# Patient Record
Sex: MALE | Race: WHITE | NOT HISPANIC OR LATINO | Employment: UNEMPLOYED | URBAN - METROPOLITAN AREA
[De-identification: names, ages, dates, MRNs, and addresses within clinical notes are randomized per-mention and may not be internally consistent; named-entity substitution may affect disease eponyms.]

---

## 2021-03-31 ENCOUNTER — OFFICE VISIT (OUTPATIENT)
Dept: MEDICAL GROUP | Facility: MEDICAL CENTER | Age: 41
End: 2021-03-31
Attending: PHYSICIAN ASSISTANT
Payer: MEDICAID

## 2021-03-31 VITALS
TEMPERATURE: 97.5 F | BODY MASS INDEX: 25.22 KG/M2 | HEART RATE: 85 BPM | SYSTOLIC BLOOD PRESSURE: 136 MMHG | OXYGEN SATURATION: 96 % | RESPIRATION RATE: 18 BRPM | DIASTOLIC BLOOD PRESSURE: 70 MMHG | HEIGHT: 72 IN | WEIGHT: 186.2 LBS

## 2021-03-31 DIAGNOSIS — G89.29 CHRONIC LEFT-SIDED LOW BACK PAIN WITH LEFT-SIDED SCIATICA: ICD-10-CM

## 2021-03-31 DIAGNOSIS — M79.675 PAIN OF LEFT GREAT TOE: ICD-10-CM

## 2021-03-31 DIAGNOSIS — M54.42 CHRONIC LEFT-SIDED LOW BACK PAIN WITH LEFT-SIDED SCIATICA: ICD-10-CM

## 2021-03-31 DIAGNOSIS — Z00.00 HEALTH CARE MAINTENANCE: ICD-10-CM

## 2021-03-31 DIAGNOSIS — L03.032 CELLULITIS OF GREAT TOE OF LEFT FOOT: ICD-10-CM

## 2021-03-31 PROCEDURE — 99204 OFFICE O/P NEW MOD 45 MIN: CPT | Performed by: PHYSICIAN ASSISTANT

## 2021-03-31 PROCEDURE — 99203 OFFICE O/P NEW LOW 30 MIN: CPT | Performed by: PHYSICIAN ASSISTANT

## 2021-03-31 RX ORDER — NAPROXEN 250 MG/1
250 TABLET ORAL 2 TIMES DAILY WITH MEALS
COMMUNITY
End: 2022-08-16

## 2021-03-31 RX ORDER — OMEPRAZOLE 20 MG/1
20 CAPSULE, DELAYED RELEASE ORAL DAILY
COMMUNITY
End: 2021-04-21

## 2021-03-31 RX ORDER — CEPHALEXIN 500 MG/1
500 CAPSULE ORAL 4 TIMES DAILY
Qty: 28 CAPSULE | Refills: 0 | Status: SHIPPED | OUTPATIENT
Start: 2021-03-31 | End: 2021-04-07

## 2021-03-31 ASSESSMENT — PATIENT HEALTH QUESTIONNAIRE - PHQ9: CLINICAL INTERPRETATION OF PHQ2 SCORE: 0

## 2021-03-31 NOTE — PROGRESS NOTES
"Chief Complaint   Patient presents with   • Back Pain   • Toe Pain     Left side    • Establish Care     Subjective:     HPI:   North Kohli is a 40 y.o. male here to establish care and to discuss the evaluation and management of:    Chronic left-sided low back pain with left-sided sciatica  Onset: Gradual. Started in October 2020. Atraumatic.   Location: left thoracolumbar.   Duration: \"Constant.\"   Characteristics: \"8/10 - sharp pain.\"   Aggravating factors: bending over to toward the toes.   Relieving factors: Naproxen and hot compresses.   Treatments tried: Naproxen had mild benefit.   Radiculopathy: left sided buttocks/thighs/lower legs     Red flag signs:  Recent trauma: None.   Bowel/bladder Incontinence or urinary retention: None.   Saddle anesthesia: None.   Weakness, falls: None.   Night pain/fever/weight loss/night sweats: None.   IV drug use: None.   History of cancer (excluding nonmelanoma skin cancer): None.     Pain of left great toe  North presents today to establish care. He reports a 1.5 week history of left great toe pain.  He reports that he had what he describes as a blood blister form under the left great toenail.  This popped and he experienced drainage from underneath the toenail.  He states that the toenail is almost completely detached from the nail bed and he is still experiencing minor discharge.  He has noticed erythema of the great toe, swelling and increased pain.  He denies fevers or chills.    ROS  See HPI.     No Known Allergies    Current medicines (including changes today)  Current Outpatient Medications   Medication Sig Dispense Refill   • naproxen (NAPROSYN) 250 MG Tab Take 250 mg by mouth 2 times a day, with meals.     • omeprazole (PRILOSEC) 20 MG delayed-release capsule Take 20 mg by mouth every day.     • cephALEXin (KEFLEX) 500 MG Cap Take 1 capsule by mouth 4 times a day for 7 days. 28 capsule 0     No current facility-administered medications for this visit. "     He  has a past medical history of Anxiety, Depression, and GERD (gastroesophageal reflux disease).  He  has a past surgical history that includes ent surgery (2000).  Social History     Tobacco Use   • Smoking status: Never Smoker   • Smokeless tobacco: Never Used   Substance Use Topics   • Alcohol use: Not Currently   • Drug use: Not Currently     Comment: meth and THC       Family History   Problem Relation Age of Onset   • Lung Disease Neg Hx    • Psychiatric Illness Neg Hx    • Heart Disease Neg Hx    • Diabetes Neg Hx    • Hypertension Neg Hx    • Hyperlipidemia Neg Hx      Family Status   Relation Name Status   • Neg Hx  (Not Specified)     Patient Active Problem List    Diagnosis Date Noted   • Chronic left-sided low back pain with left-sided sciatica 03/31/2021   • Pain of left great toe 03/31/2021      Objective:     /70 (BP Location: Right arm, Patient Position: Sitting, BP Cuff Size: Adult long)   Pulse 85   Temp 36.4 °C (97.5 °F) (Temporal)   Resp 18   Ht 1.829 m (6')   Wt 84.5 kg (186 lb 3.2 oz)   SpO2 96%  Body mass index is 25.25 kg/m².    Physical Exam:  Physical Exam   Constitutional: He is oriented to person, place, and time and well-developed, well-nourished, and in no distress.   HENT:   Head: Normocephalic.   Right Ear: External ear normal.   Left Ear: External ear normal.   Eyes: Pupils are equal, round, and reactive to light.   Cardiovascular: Normal rate, regular rhythm and normal heart sounds.   Pulmonary/Chest: Effort normal and breath sounds normal.   Musculoskeletal:      Comments: No physical abnormalities or vertebral tenderness.  Normal gait, strength, sensation and reflexes.   Decreased lumbar ROM due to pain.  Positive left sided slump test. Positive left sided Straight leg raise.   Neurological: He is alert and oriented to person, place, and time. Gait normal.   Skin: Skin is warm and dry.   Acute presence of demarcated area of erythema, warmth upon touch, swelling  and discharge from beneath the nail bed.  Diana cellulitis of left great toe.  Great toenail lifted off from the nailbed.    Psychiatric: Affect and judgment normal.   Vitals reviewed.    Assessment and Plan:     The following treatment plan was discussed:    1. Chronic left-sided low back pain with left-sided sciatica  - This is a chronic condition.  - No red flag signs.  - Positive slump and straight leg raise test.  - Plan: Order plain films of the lumbar spine to evaluate for potential bony or osseous abnormalities.  I will notify the patient via telephone once I received and reviewed his imaging results.  - DX-LUMBAR SPINE-4+ VIEWS; Future    2. Cellulitis of great toe of left foot, pain of left great toe  - This is an acute condition.  Vitals are stable.  - See PE for more detail.  - Plan: Start on 7-day course of Keflex for acute infection.  Keep area clean and dry.  If symptoms persist or worsen despite antibiotic therapy he has been instructed to follow-up for further evaluation.  - cephALEXin (KEFLEX) 500 MG Cap; Take 1 capsule by mouth 4 times a day for 7 days.  Dispense: 28 capsule; Refill: 0    3. Health care maintenance  - North is due for yearly labs.  - Comp Metabolic Panel; Future  - CBC WITH DIFFERENTIAL; Future  - HEMOGLOBIN A1C; Future  - Lipid Profile; Future  - TSH WITH REFLEX TO FT4; Future  - VITAMIN D,25 HYDROXY; Future  - VITAMIN B12; Future  - FOLATE; Future  - I will notify the patient via telephone once I have received and reviewed his lab results.    Any change or worsening of signs or symptoms, patient encouraged to follow-up or report to emergency room for further evaluation. Patient verbalizes understanding and agrees.    Follow-Up: Return if symptoms worsen or fail to improve.      PLEASE NOTE: This dictation was created using voice recognition software. I have made every reasonable attempt to correct obvious errors, but I expect that there are errors of grammar and possibly  content that I did not discover before finalizing the note.

## 2021-03-31 NOTE — ASSESSMENT & PLAN NOTE
North presents today to establish care. He reports a 1.5 week history of left great toe pain.  He reports that he had what he describes as a blood blister form under the left great toenail.  This popped and he experienced drainage from underneath the toenail.  He states that the toenail is almost completely detached from the nail bed and he is still experiencing minor discharge.  He has noticed erythema of the great toe, swelling and increased pain.  He denies fevers or chills.

## 2021-03-31 NOTE — ASSESSMENT & PLAN NOTE
"Onset: Gradual. Started in October 2020. Atraumatic.   Location: left thoracolumbar.   Duration: \"Constant.\"   Characteristics: \"8/10 - sharp pain.\"   Aggravating factors: bending over to toward the toes.   Relieving factors: Naproxen and hot compresses.   Treatments tried: Naproxen had mild benefit.   Radiculopathy: left sided buttocks/thighs/lower legs     Red flag signs:  Recent trauma: None.   Bowel/bladder Incontinence or urinary retention: None.   Saddle anesthesia: None.   Weakness, falls: None.   Night pain/fever/weight loss/night sweats: None.   IV drug use: None.   History of cancer (excluding nonmelanoma skin cancer): None.     "

## 2021-04-02 ENCOUNTER — HOSPITAL ENCOUNTER (OUTPATIENT)
Dept: RADIOLOGY | Facility: MEDICAL CENTER | Age: 41
End: 2021-04-02
Attending: PHYSICIAN ASSISTANT
Payer: MEDICAID

## 2021-04-02 DIAGNOSIS — M54.42 CHRONIC LEFT-SIDED LOW BACK PAIN WITH LEFT-SIDED SCIATICA: ICD-10-CM

## 2021-04-02 DIAGNOSIS — G89.29 CHRONIC LEFT-SIDED LOW BACK PAIN WITH LEFT-SIDED SCIATICA: ICD-10-CM

## 2021-04-02 PROCEDURE — 72110 X-RAY EXAM L-2 SPINE 4/>VWS: CPT

## 2021-04-07 DIAGNOSIS — G89.29 CHRONIC LEFT-SIDED LOW BACK PAIN WITH LEFT-SIDED SCIATICA: ICD-10-CM

## 2021-04-07 DIAGNOSIS — M54.42 CHRONIC LEFT-SIDED LOW BACK PAIN WITH LEFT-SIDED SCIATICA: ICD-10-CM

## 2021-04-13 ENCOUNTER — HOSPITAL ENCOUNTER (OUTPATIENT)
Dept: LAB | Facility: MEDICAL CENTER | Age: 41
End: 2021-04-13
Attending: PHYSICIAN ASSISTANT
Payer: MEDICAID

## 2021-04-13 DIAGNOSIS — Z00.00 HEALTH CARE MAINTENANCE: ICD-10-CM

## 2021-04-13 LAB
ALBUMIN SERPL BCP-MCNC: 4.4 G/DL (ref 3.2–4.9)
ALBUMIN/GLOB SERPL: 1.8 G/DL
ALP SERPL-CCNC: 32 U/L (ref 30–99)
ALT SERPL-CCNC: 33 U/L (ref 2–50)
ANION GAP SERPL CALC-SCNC: 8 MMOL/L (ref 7–16)
AST SERPL-CCNC: 26 U/L (ref 12–45)
BASOPHILS # BLD AUTO: 0.7 % (ref 0–1.8)
BASOPHILS # BLD: 0.04 K/UL (ref 0–0.12)
BILIRUB SERPL-MCNC: 0.9 MG/DL (ref 0.1–1.5)
BUN SERPL-MCNC: 26 MG/DL (ref 8–22)
CALCIUM SERPL-MCNC: 9.4 MG/DL (ref 8.5–10.5)
CHLORIDE SERPL-SCNC: 107 MMOL/L (ref 96–112)
CHOLEST SERPL-MCNC: 145 MG/DL (ref 100–199)
CO2 SERPL-SCNC: 27 MMOL/L (ref 20–33)
CREAT SERPL-MCNC: 1.11 MG/DL (ref 0.5–1.4)
EOSINOPHIL # BLD AUTO: 0.17 K/UL (ref 0–0.51)
EOSINOPHIL NFR BLD: 2.9 % (ref 0–6.9)
ERYTHROCYTE [DISTWIDTH] IN BLOOD BY AUTOMATED COUNT: 44.5 FL (ref 35.9–50)
EST. AVERAGE GLUCOSE BLD GHB EST-MCNC: 91 MG/DL
FASTING STATUS PATIENT QL REPORTED: NORMAL
FOLATE SERPL-MCNC: 12.3 NG/ML
GLOBULIN SER CALC-MCNC: 2.4 G/DL (ref 1.9–3.5)
GLUCOSE SERPL-MCNC: 80 MG/DL (ref 65–99)
HBA1C MFR BLD: 4.8 % (ref 4–5.6)
HCT VFR BLD AUTO: 43.1 % (ref 42–52)
HDLC SERPL-MCNC: 46 MG/DL
HGB BLD-MCNC: 14.6 G/DL (ref 14–18)
IMM GRANULOCYTES # BLD AUTO: 0.01 K/UL (ref 0–0.11)
IMM GRANULOCYTES NFR BLD AUTO: 0.2 % (ref 0–0.9)
LDLC SERPL CALC-MCNC: 86 MG/DL
LYMPHOCYTES # BLD AUTO: 2.24 K/UL (ref 1–4.8)
LYMPHOCYTES NFR BLD: 38 % (ref 22–41)
MCH RBC QN AUTO: 31.5 PG (ref 27–33)
MCHC RBC AUTO-ENTMCNC: 33.9 G/DL (ref 33.7–35.3)
MCV RBC AUTO: 92.9 FL (ref 81.4–97.8)
MONOCYTES # BLD AUTO: 0.48 K/UL (ref 0–0.85)
MONOCYTES NFR BLD AUTO: 8.1 % (ref 0–13.4)
NEUTROPHILS # BLD AUTO: 2.95 K/UL (ref 1.82–7.42)
NEUTROPHILS NFR BLD: 50.1 % (ref 44–72)
NRBC # BLD AUTO: 0 K/UL
NRBC BLD-RTO: 0 /100 WBC
PLATELET # BLD AUTO: 241 K/UL (ref 164–446)
PMV BLD AUTO: 11.4 FL (ref 9–12.9)
POTASSIUM SERPL-SCNC: 4.3 MMOL/L (ref 3.6–5.5)
PROT SERPL-MCNC: 6.8 G/DL (ref 6–8.2)
RBC # BLD AUTO: 4.64 M/UL (ref 4.7–6.1)
SODIUM SERPL-SCNC: 142 MMOL/L (ref 135–145)
TRIGL SERPL-MCNC: 66 MG/DL (ref 0–149)
TSH SERPL DL<=0.005 MIU/L-ACNC: 1.05 UIU/ML (ref 0.38–5.33)
VIT B12 SERPL-MCNC: 620 PG/ML (ref 211–911)
WBC # BLD AUTO: 5.9 K/UL (ref 4.8–10.8)

## 2021-04-13 PROCEDURE — 82306 VITAMIN D 25 HYDROXY: CPT

## 2021-04-13 PROCEDURE — 80061 LIPID PANEL: CPT

## 2021-04-13 PROCEDURE — 83036 HEMOGLOBIN GLYCOSYLATED A1C: CPT

## 2021-04-13 PROCEDURE — 82607 VITAMIN B-12: CPT

## 2021-04-13 PROCEDURE — 36415 COLL VENOUS BLD VENIPUNCTURE: CPT

## 2021-04-13 PROCEDURE — 85025 COMPLETE CBC W/AUTO DIFF WBC: CPT

## 2021-04-13 PROCEDURE — 82746 ASSAY OF FOLIC ACID SERUM: CPT

## 2021-04-13 PROCEDURE — 80053 COMPREHEN METABOLIC PANEL: CPT

## 2021-04-13 PROCEDURE — 84443 ASSAY THYROID STIM HORMONE: CPT

## 2021-04-15 LAB — 25(OH)D3 SERPL-MCNC: 40 NG/ML (ref 30–80)

## 2021-04-21 ENCOUNTER — OFFICE VISIT (OUTPATIENT)
Dept: MEDICAL GROUP | Facility: MEDICAL CENTER | Age: 41
End: 2021-04-21
Attending: PHYSICIAN ASSISTANT
Payer: MEDICAID

## 2021-04-21 VITALS
SYSTOLIC BLOOD PRESSURE: 106 MMHG | HEART RATE: 52 BPM | DIASTOLIC BLOOD PRESSURE: 40 MMHG | TEMPERATURE: 97.7 F | OXYGEN SATURATION: 97 % | WEIGHT: 196.6 LBS | HEIGHT: 72 IN | BODY MASS INDEX: 26.63 KG/M2 | RESPIRATION RATE: 18 BRPM

## 2021-04-21 DIAGNOSIS — D22.9 NUMEROUS SKIN MOLES: ICD-10-CM

## 2021-04-21 DIAGNOSIS — Z87.09 HISTORY OF CHRONIC SINUSITIS: ICD-10-CM

## 2021-04-21 DIAGNOSIS — K21.9 GASTROESOPHAGEAL REFLUX DISEASE WITHOUT ESOPHAGITIS: ICD-10-CM

## 2021-04-21 PROCEDURE — 99203 OFFICE O/P NEW LOW 30 MIN: CPT | Performed by: PHYSICIAN ASSISTANT

## 2021-04-21 PROCEDURE — 99213 OFFICE O/P EST LOW 20 MIN: CPT | Performed by: PHYSICIAN ASSISTANT

## 2021-04-21 PROCEDURE — 700111 HCHG RX REV CODE 636 W/ 250 OVERRIDE (IP): Performed by: PHYSICIAN ASSISTANT

## 2021-04-21 PROCEDURE — 99214 OFFICE O/P EST MOD 30 MIN: CPT | Performed by: PHYSICIAN ASSISTANT

## 2021-04-21 RX ORDER — TRIAMCINOLONE ACETONIDE 40 MG/ML
40 INJECTION, SUSPENSION INTRA-ARTICULAR; INTRAMUSCULAR ONCE
Status: COMPLETED | OUTPATIENT
Start: 2021-04-21 | End: 2021-04-21

## 2021-04-21 RX ADMIN — TRIAMCINOLONE ACETONIDE 40 MG: 40 INJECTION, SUSPENSION INTRA-ARTICULAR; INTRAMUSCULAR at 13:22

## 2021-04-21 ASSESSMENT — FIBROSIS 4 INDEX: FIB4 SCORE: 0.73

## 2021-04-21 NOTE — PROGRESS NOTES
Chief Complaint   Patient presents with   • Follow-Up       Subjective:     HPI:   North Kohli is a 40 y.o. male here to discuss the evaluation and management of:    Gastroesophageal reflux disease without esophagitis  This is a chronic condition.  Symptoms: burning sensation, acid reflux, nocturnal/positional pain, antacid relief.  No alarm symptoms: weight loss, dysphagia, epigastric mass, regurgitation, odynophagia, N/V, choking  No atypical symptoms: chronic cough, globus sensation, epigastric pain.  Risk Factors: obesity, recent weight gain, FMHx.  Currently on Omeprazole 20 mg once daily.     Numerous skin moles  North reports a history of multiple moles on his thorax and face. He states that he would like to have them looked at and potentially removed.     ROS  See HPI.     No Known Allergies    Current medicines (including changes today)  Current Outpatient Medications   Medication Sig Dispense Refill   • Omeprazole 20 MG Tablet Delayed Release Dispersible Take 20 mg by mouth every day. 30 tablet 5   • naproxen (NAPROSYN) 250 MG Tab Take 250 mg by mouth 2 times a day, with meals.       No current facility-administered medications for this visit.       Social History     Tobacco Use   • Smoking status: Never Smoker   • Smokeless tobacco: Never Used   Substance Use Topics   • Alcohol use: Not Currently   • Drug use: Not Currently     Comment: meth and THC       Patient Active Problem List    Diagnosis Date Noted   • Gastroesophageal reflux disease without esophagitis 04/21/2021   • Numerous skin moles 04/21/2021   • Chronic left-sided low back pain with left-sided sciatica 03/31/2021   • Pain of left great toe 03/31/2021       Family History   Problem Relation Age of Onset   • Lung Disease Neg Hx    • Psychiatric Illness Neg Hx    • Heart Disease Neg Hx    • Diabetes Neg Hx    • Hypertension Neg Hx    • Hyperlipidemia Neg Hx         Objective:     /40 (BP Location: Right arm, Patient Position:  Sitting, BP Cuff Size: Adult)   Pulse (!) 52   Temp 36.5 °C (97.7 °F) (Temporal)   Resp 18   Ht 1.829 m (6')   Wt 89.2 kg (196 lb 9.6 oz)   SpO2 97%  Body mass index is 26.66 kg/m².    Physical Exam:  Physical Exam   Constitutional: He is oriented to person, place, and time and well-developed, well-nourished, and in no distress.   Cardiovascular: Normal rate, regular rhythm and normal heart sounds.   Pulmonary/Chest: Effort normal and breath sounds normal.   Neurological: He is alert and oriented to person, place, and time. Gait normal.   Skin: Skin is warm and dry.   Multiple fleshy moles of the thorax.    Psychiatric: Affect and judgment normal.   Vitals reviewed.    Assessment and Plan:     The following treatment plan was discussed:    1. Gastroesophageal reflux disease without esophagitis  - This is a chronic condition.   - Plan: Continue on Omeprazole 20 mg once daily. Refill sent to the pharmacy.   - Lifestyle modifications for GERD were discussed to include identifying trigger foods to avoid, avoiding high fat foods and large meals, to be upright while eating and to remain this way for at least 2 hours after meals. No eating prior to bedtime. Avoid chocolate, mint, alcohol, and caffeine as these are common triggers for GERD.  - Omeprazole 20 MG Tablet Delayed Release Dispersible; Take 20 mg by mouth every day.  Dispense: 30 tablet; Refill: 5    2. Numerous skin moles  - REFERRAL TO DERMATOLOGY for annual skin checks and potential biopsy of abnormal lesions.     3. History of chronic sinusitis  - This is a chronic condition.   - Plan: Continue with Kenalog injections every 6 months. Patient tolerated this well.  - triamcinolone acetonide (KENALOG-40) injection 40 mg    Any change or worsening of signs or symptoms, patient encouraged to follow-up or report to emergency room for further evaluation. Patient verbalizes understanding and agrees.    Follow-Up: Return if symptoms worsen or fail to  improve.      PLEASE NOTE: This dictation was created using voice recognition software. I have made every reasonable attempt to correct obvious errors, but I expect that there are errors of grammar and possibly content that I did not discover before finalizing the note.

## 2021-04-21 NOTE — ASSESSMENT & PLAN NOTE
This is a chronic condition.  Symptoms: burning sensation, acid reflux, nocturnal/positional pain, antacid relief.  No alarm symptoms: weight loss, dysphagia, epigastric mass, regurgitation, odynophagia, N/V, choking  No atypical symptoms: chronic cough, globus sensation, epigastric pain.  Risk Factors: obesity, recent weight gain, FMHx.  Currently on Omeprazole 20 mg once daily.

## 2021-04-21 NOTE — ASSESSMENT & PLAN NOTE
North reports a history of multiple moles on his thorax and face. He states that he would like to have them looked at and potentially removed.

## 2021-05-03 ENCOUNTER — PHYSICAL THERAPY (OUTPATIENT)
Dept: PHYSICAL THERAPY | Facility: REHABILITATION | Age: 41
End: 2021-05-03
Attending: PHYSICIAN ASSISTANT
Payer: MEDICAID

## 2021-05-03 DIAGNOSIS — G89.29 CHRONIC LEFT-SIDED LOW BACK PAIN WITH LEFT-SIDED SCIATICA: ICD-10-CM

## 2021-05-03 DIAGNOSIS — M54.42 CHRONIC LEFT-SIDED LOW BACK PAIN WITH LEFT-SIDED SCIATICA: ICD-10-CM

## 2021-05-03 PROCEDURE — 97162 PT EVAL MOD COMPLEX 30 MIN: CPT

## 2021-05-03 ASSESSMENT — ENCOUNTER SYMPTOMS
QUALITY: NUMBNESS
PAIN LOCATION: LOW BACK AND LEFT LEG
ALLEVIATING FACTORS: BACK BRACE
PAIN TIMING: CONSTANT
ALLEVIATING FACTORS: HEAT APPLICATION
EXACERBATED BY: PROLONGED STANDING
EXACERBATED BY: LIFTING
EXACERBATED BY: CARRYING
PAIN SCALE: 4
ALLEVIATING FACTORS: PHARMACOTHERAPY
EXACERBATED BY: BENDING
QUALITY: TIGHT
PAIN TIMING: INTERMITTENT

## 2021-05-03 ASSESSMENT — ACTIVITIES OF DAILY LIVING (ADL): AMBULATION_WITHOUT_ASSISTIVE_DEVICE: INDEPENDENT

## 2021-05-03 NOTE — OP THERAPY EVALUATION
Outpatient Physical Therapy  INITIAL EVALUATION    [facility] Physical [address]  [address]  [facility] [address]  Phone:  [phone]  Fax:  [phone]    Date of Evaluation: 2021    Patient: North Kohli  YOB: 1980  MRN: [id number]     Referring Provider: Victorina Iglesias P.A.-C.  [address] [place] [address]   Referring Diagnosis Chronic left-sided low back pain with left-sided sciatica [M54.42, G89.29]     Time Calculation    Start time: 1330  Stop time: 1424 Time Calculation (min): 54 minutes         Chief Complaint: Back Problem    Visit Diagnoses     ICD-10-CM   1. Chronic left-sided low back pain with left-sided sciatica  M54.42    G89.29       No data found  Subjective:   History of Present Illness:     Date of onset:  10/2/2020    Mechanism of injury:  Back pain since 2020.  Thinks he injured back when weight lifting.  Not as bad now as it was initially.  Left low back pain to posterior left leg to just above knee.  Pt reports numbness in lateral left hip/ posterior thigh is almost constant.  Pain and tightness in low back.  Difficulty getting comfortable lying down at night.  Feels weak in left abdominal, lumbar, hip area.  Pain agg with forward flexion, sitting > 20 min, standing > 5-10 min.  Has to move and turn a lot in bed, feels stiff when lying/sleeping in one position for 30 min or more.  Mornings are difficult to move.  Naproxen daily helps a little.  Back brace sometimes at work, off and on for a few days.    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      Sleep disturbance:  Non-restful sleep (used to toss and turn all night, not as bad now as it was initially)  Pain:     Current pain ratin    Location:  Low back and left leg    Quality:  Tight and numbness    Pain timing:  Constant and intermittent    Relieving factors:  Heat application, back brace and pharmacotherapy (R sidelying)    Aggravating factors:  Prolonged standing,  bending, carrying and lifting  Diagnostic Tests:     X-ray: abnormal      Diagnostic Tests Comments:  The alignment is normal. The vertebral bodies are of normal height. The L5-S1 disc space is slightly narrowed. The remaining spaces are maintained. There is no alteration of alignment with flexion or extension. Minimal anterior spurring is present at L3   and L4.  IMPRESSION:  Disc space narrowing at L5-S1.  Treatments:     None    Patient Goals:     Patient goals for therapy:  Decreased pain and return to sport/leisure activities      Past Medical History:   Diagnosis Date   • Anxiety    • Depression    • GERD (gastroesophageal reflux disease)      Past Surgical History:   Procedure Laterality Date   • ENT SURGERY  2000    adenoids and tubes removed.      Social History     Tobacco Use   • Smoking status: Never Smoker   • Smokeless tobacco: Never Used   Substance Use Topics   • Alcohol use: Not Currently     Family and Occupational History     Socioeconomic History   • Marital status: Single     Spouse name: Not on file   • Number of children: Not on file   • Years of education: Not on file   • Highest education level: Not on file   Occupational History   • Not on file       Objective     Observations   Central spine     Positive for hyperlordosis and thoracic kyphosis.    Palpation   Left   Tenderness of the lumbar paraspinals.     Tenderness     Left Hip   Tenderness in the PSIS and iliolumbar ligament.  No tenderness in the greater trochanter and iliac crest.     Active Range of Motion     Lumbar   Flexion: decreased (50%, increased lumbar pain)  Extension: within functional limits (left glute pain)  Left lateral flexion: within functional limits  Right lateral flexion: within functional limits  Left rotation: within functional limits  Right rotation: within functional limits    Joint Play   Spine     Central PA Temple        L1: WFL       L2: WFL       L3: WFL       L4: hypomobile and painful       L5:  hypomobile and painful       S1: hypomobile and painful    Unilateral PA Glide (left)        S1: hypomobile and painful    Unilateral PA Glide (right)        S1: hypomobile    Left Hip     Long axis distraction: within functional limits    Additional hip joint play details: Left long axis hip distraction causes pain at left glute fold.  Prone L hip IR causes left SI pain.         Strength:      Left Hip   Planes of Motion   Flexion: 4 (painful)  Extension: 4 (painful)  Abduction: 4  Adduction: 4+    Right Hip   Planes of Motion   Flexion: 4 (L lumbar pain)  Extension: 4 (L lumbar pain)  Abduction: 5  Adduction: 5    Left Knee   Flexion: 4- (painful)  Extension: 4- (painful)    Right Knee   Flexion: 5  Extension: 5    Left Ankle/Foot   Dorsiflexion: 4+  Plantar flexion: 4+    Right Ankle/Foot   Dorsiflexion: 5  Plantar flexion: 5    Tests       Lumbar spine (left)      Positive slump.   Lumbar spine (right)     Negative slump.     Left Pelvic Girdle/Sacrum   Negative: sacral thrust and thigh thrust.     Right Pelvic Girdle/Sacrum   Negative: thigh thrust.     Left Hip   Negative SI compression and SI distraction.   SLR: Positive.     Right Hip   Negative SI compression and SI distraction.   SLR: Negative.     Yin LumbarTest   Static tests   Lying prone: no change  Lying prone on elbows: increased lumbar pain, no peripheral symptoms.  Sitting erect: increased leg symptoms  Ambulation     Ambulation: Level Surfaces   Ambulation without assistive device: independent    Observational Gait   Gait: antalgic   Walking speed within functional limits. Increased left swing time.         Therapeutic Exercises (CPT 50699):     1. SKC    2. Supine active HS stretch/ sciatic nerve glide    3. Hooklying TA bracing    4. Hooklying TA march    5. Seated sciatic nerve glides    6. Prone on elbows    7. Standing TA bracing against wall      Therapeutic Exercise Summary: Pt performed these exercises with instruction and SPV.   Provided handout with these exercises for daily HEP.      Time-based treatments/modalities:           Assessment, Response and Plan:   Impairments: abnormal or restricted ROM, difficulty performing job, impaired physical strength, lacks appropriate home exercise program and pain with function    Assessment details:  40 y.o. male presents with lumbar radiculopathy.  Pt demonstrates excess lumbar lordosis, thoracic kyphosis, poor spine alignment and body mechanics.  Pt will benefit from skilled PT services to improve lumbar stabilization, posture, and decreased pain with function.  Prognosis: good    Goals:   Short Term Goals:   1. Standing L/S AROM= WNL in all directions.  2. Pt reports less frequent and less intense peripheral LLE pain/ symptoms.  3. Pt will be independent with daily HEP.  Short term goal time span:  2-4 weeks      Long Term Goals:    1. Pt able to sleep with fewer interruptions from pain.  2. Decreased anti-inflammatory med use required.  3. Patient able to brayn standing 30 min without LLE symptoms.  4. Pt will report increased function via improved scores on Piyush Jude Low Back Pain and Disability Questionnaire.  Long term goal time span:  2-4 months    Plan:   Therapy options:  Physical therapy treatment to continue  Planned therapy interventions:  Therapeutic Exercise (CPT 28121), Mechanical Traction (CPT 13501) and E Stim Unattended (CPT 95590)  Frequency:  2x week  Duration in visits:  10  Discussed with:  Patient      Functional Assessment Used  Piyush Jude Low Back Pain and Disability Score: 25     Referring provider co-signature:  I have reviewed this plan of care and my co-signature certifies the need for services.    Certification Period: 05/03/2021 to  07/12/21    Physician Signature: ________________________________ Date: ______________

## 2021-05-25 ENCOUNTER — PHYSICAL THERAPY (OUTPATIENT)
Dept: PHYSICAL THERAPY | Facility: REHABILITATION | Age: 41
End: 2021-05-25
Attending: PHYSICIAN ASSISTANT
Payer: MEDICAID

## 2021-05-25 DIAGNOSIS — M54.42 CHRONIC LEFT-SIDED LOW BACK PAIN WITH LEFT-SIDED SCIATICA: ICD-10-CM

## 2021-05-25 DIAGNOSIS — G89.29 CHRONIC LEFT-SIDED LOW BACK PAIN WITH LEFT-SIDED SCIATICA: ICD-10-CM

## 2021-05-25 PROCEDURE — 97110 THERAPEUTIC EXERCISES: CPT

## 2021-05-25 PROCEDURE — 97012 MECHANICAL TRACTION THERAPY: CPT

## 2021-05-25 NOTE — OP THERAPY DAILY TREATMENT
"  Outpatient Physical Therapy  DAILY TREATMENT     [facility] Physical [address]  [address]  [place] [address]  Phone:  [phone]  Fax:  [phone]    Date: 05/25/2021    Patient: North Kohli  YOB: 1980  MRN: [id number]     Time Calculation    Start time: 0930  Stop time: 1020 Time Calculation (min): 50 minutes         Chief Complaint: Back Problem    Visit #: 2    SUBJECTIVE:  Pt reports back is a little better since eval.  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  Feels tight on left side, all the way up to neck.  Maybe from work.  States he has been \"tucking in gut\" a lot which seems to help, and DKC stretch feels good.  Taking 1/2 naproxen daily.    OBJECTIVE:        Therapeutic Exercises (CPT 57628):     1. SKC, x30 sec L    2. LTR with ball, x10    3. Hooklying TA bracing, 5sec x5    4. Hooklying TA DKC with ball, x10    5. Hooklying TA bent knee fall out, x10 alt R/L with med band    6. TA bridge, x10    7. Seated forward ball roll flexion stretch, x10    8. Standing against 1/2 FR, scap retraction x15, B shoulder ER with extra light ressitance band 10    14. Prone :, STM left lumbar paraspinals, PAs [id number]    15. Supine :, Left sciatic nerve glide progression    Therapeutic Treatments and Modalities:     1. Mechanical Traction (CPT 31276), lumbar traction, 100#/80#, 60 sec/ 20 sec, x15 min with     Time-based treatments/modalities:    Physical Therapy Timed Treatment Charges  Therapeutic exercise minutes (CPT 76721): 30 minutes    ASSESSMENT:   Response to treatment: Pt demo left lumbar pain and radicular symptoms.  No peripheral symptoms following [facility] traction.    PLAN/RECOMMENDATIONS:   Plan for treatment: therapy treatment to continue next visit.  Planned interventions for next visit: continue with current treatment.       "

## 2021-05-27 ENCOUNTER — PHYSICAL THERAPY (OUTPATIENT)
Dept: PHYSICAL THERAPY | Facility: REHABILITATION | Age: 41
End: 2021-05-27
Attending: PHYSICIAN ASSISTANT
Payer: MEDICAID

## 2021-05-27 DIAGNOSIS — G89.29 CHRONIC LEFT-SIDED LOW BACK PAIN WITH LEFT-SIDED SCIATICA: ICD-10-CM

## 2021-05-27 DIAGNOSIS — M54.42 CHRONIC LEFT-SIDED LOW BACK PAIN WITH LEFT-SIDED SCIATICA: ICD-10-CM

## 2021-05-27 PROCEDURE — 97110 THERAPEUTIC EXERCISES: CPT

## 2021-06-01 ENCOUNTER — APPOINTMENT (OUTPATIENT)
Dept: PHYSICAL THERAPY | Facility: REHABILITATION | Age: 41
End: 2021-06-01
Attending: PHYSICIAN ASSISTANT
Payer: MEDICAID

## 2021-06-04 ENCOUNTER — APPOINTMENT (OUTPATIENT)
Dept: PHYSICAL THERAPY | Facility: REHABILITATION | Age: 41
End: 2021-06-04
Attending: PHYSICIAN ASSISTANT
Payer: MEDICAID

## 2021-06-07 ENCOUNTER — HOSPITAL ENCOUNTER (EMERGENCY)
Dept: HOSPITAL 8 - ED | Age: 41
Discharge: HOME | End: 2021-06-07
Payer: SELF-PAY

## 2021-06-07 VITALS — SYSTOLIC BLOOD PRESSURE: 105 MMHG | DIASTOLIC BLOOD PRESSURE: 84 MMHG

## 2021-06-07 VITALS — HEIGHT: 72 IN | WEIGHT: 191.8 LBS | BODY MASS INDEX: 25.98 KG/M2

## 2021-06-07 DIAGNOSIS — R10.84: ICD-10-CM

## 2021-06-07 DIAGNOSIS — R11.2: Primary | ICD-10-CM

## 2021-06-07 DIAGNOSIS — R19.7: ICD-10-CM

## 2021-06-07 LAB
<PLATELET ESTIMATE>: ADEQUATE
<PLT MORPHOLOGY>: (no result)
ALBUMIN SERPL-MCNC: 4.2 G/DL (ref 3.4–5)
ALP SERPL-CCNC: 30 U/L (ref 45–117)
ALT SERPL-CCNC: 31 U/L (ref 12–78)
ANION GAP SERPL CALC-SCNC: 7 MMOL/L (ref 5–15)
BAND#(MANUAL): 1.92 X10^3/UL
BILIRUB DIRECT SERPL-MCNC: NORMAL MG/DL
BILIRUB SERPL-MCNC: 0.9 MG/DL (ref 0.2–1)
CALCIUM SERPL-MCNC: 9.3 MG/DL (ref 8.5–10.1)
CHLORIDE SERPL-SCNC: 111 MMOL/L (ref 98–107)
CREAT SERPL-MCNC: 1.24 MG/DL (ref 0.7–1.3)
ERYTHROCYTE [DISTWIDTH] IN BLOOD BY AUTOMATED COUNT: 13.1 % (ref 9.4–14.8)
LYMPH#(MANUAL): 0.32 X10^3/UL (ref 1–3.4)
LYMPHS% (MANUAL): 2 % (ref 22–44)
MCH RBC QN AUTO: 32 PG (ref 27.5–34.5)
MCHC RBC AUTO-ENTMCNC: 34.8 G/DL (ref 33.2–36.2)
METAMYELOCYTES# (MANUAL): 0.16 X10^3/UL (ref 0–0)
METAMYELOCYTES% (MANUAL): 1 % (ref 0–1)
MICROSCOPIC: (no result)
MONOS#(MANUAL): 0.48 X10^3/UL (ref 0.3–2.7)
MONOS% (MANUAL): 3 % (ref 2–9)
NEUTS BAND NFR BLD: 12 % (ref 0–7)
PLATELET # BLD AUTO: 238 X10^3/UL (ref 130–400)
PMV BLD AUTO: 8.5 FL (ref 7.4–10.4)
PROT SERPL-MCNC: 7.6 G/DL (ref 6.4–8.2)
RBC # BLD AUTO: 5.1 X10^6/UL (ref 4.38–5.82)
SEG#(MANUAL): 13.12 X10^3/UL (ref 1.8–6.8)
SEGS% (MANUAL): 82 % (ref 42–75)

## 2021-06-07 PROCEDURE — 80053 COMPREHEN METABOLIC PANEL: CPT

## 2021-06-07 PROCEDURE — 81003 URINALYSIS AUTO W/O SCOPE: CPT

## 2021-06-07 PROCEDURE — 96361 HYDRATE IV INFUSION ADD-ON: CPT

## 2021-06-07 PROCEDURE — 85025 COMPLETE CBC W/AUTO DIFF WBC: CPT

## 2021-06-07 PROCEDURE — 99284 EMERGENCY DEPT VISIT MOD MDM: CPT

## 2021-06-07 PROCEDURE — 83690 ASSAY OF LIPASE: CPT

## 2021-06-07 PROCEDURE — 36415 COLL VENOUS BLD VENIPUNCTURE: CPT

## 2021-06-07 PROCEDURE — 96375 TX/PRO/DX INJ NEW DRUG ADDON: CPT

## 2021-06-07 PROCEDURE — 96374 THER/PROPH/DIAG INJ IV PUSH: CPT

## 2021-06-07 NOTE — NUR
2ND NS BOLUS STARTED, AND PT MED AS NOTED FOR ALL OVER PAIN" 9/10". VSS, NO 
VOMITING BUT PT CONT TO C/O NAUSEA.  CALL LIGHT W/I REACH

## 2021-06-08 ENCOUNTER — APPOINTMENT (OUTPATIENT)
Dept: PHYSICAL THERAPY | Facility: REHABILITATION | Age: 41
End: 2021-06-08
Attending: PHYSICIAN ASSISTANT
Payer: MEDICAID

## 2021-06-08 NOTE — OP THERAPY DAILY TREATMENT
"  Outpatient Physical Therapy  DAILY TREATMENT     [facility] Physical [address]  [address]  [place] [address]  Phone:  [phone]  Fax:  [phone]    Date: 06/08/2021    Patient: North Kohli  YOB: 1980  MRN: [id number]     Time Calculation                   Chief Complaint: No chief complaint on file.    Visit #: 4    SUBJECTIVE:  ***    OBJECTIVE:  Current objective measures: ***          Therapeutic Exercises (CPT 01070):     1. Supine 1/2 FR (flat top), TA march x10, alt shoulder flex x10, pain at left L4 due to pressure against FR    2. LTR with ball, x10    3. Hooklying TA bracing, 5sec x5    4. TA 90/90 march, x10    5. Squats, x10    6. Quadruped, cat-cow x10, TA tabletop position x30 sec, alt UE flex x10, pain with cow postion (lumbar ext)    7. Crossed leg LTR, x30 sec B    8. Standing against 1/2 FR, scap retraction x15, B shoulder ER with med ressitance band 15    9. Vanessa pose, 30 sec    14. R sidelying with pillow under L/S and pillow between knees:    15. Supine :, left hip long axis distraction    Therapeutic Treatments and Modalities:     1. Mechanical Traction (CPT 08412), lumbar traction, 100#/80#, 60 sec/ 20 sec, x15 min with     Time-based treatments/modalities:           Pain rating (1-10) before treatment:  {PAIN NUMBERS_1-10:42464}  Pain rating (1-10) after treatment:  {PAIN NUMBERS_1-10:17287}    ASSESSMENT:   Response to treatment: ***    PLAN/RECOMMENDATIONS:   Plan for treatment: {AMB OP THERAPY - THERAPY PLAN:[id number]::\"therapy treatment to continue next visit\"}.  Planned interventions for next visit: {PT PLANNED THERAPY INTERVENTIONS:[id number]::\"continue with current treatment\"}.       "

## 2021-06-10 ENCOUNTER — HOSPITAL ENCOUNTER (EMERGENCY)
Dept: HOSPITAL 8 - ED | Age: 41
Discharge: HOME | End: 2021-06-10
Payer: MEDICAID

## 2021-06-10 VITALS — BODY MASS INDEX: 26.87 KG/M2 | WEIGHT: 198.42 LBS | HEIGHT: 72 IN

## 2021-06-10 VITALS — DIASTOLIC BLOOD PRESSURE: 77 MMHG | SYSTOLIC BLOOD PRESSURE: 127 MMHG
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
## 2021-06-10 DIAGNOSIS — S82.52XA: Primary | ICD-10-CM
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
## 2021-06-10 PROCEDURE — 29515 APPLICATION SHORT LEG SPLINT: CPT

## 2021-06-10 PROCEDURE — 99284 EMERGENCY DEPT VISIT MOD MDM: CPT

## 2021-06-10 PROCEDURE — 99283 EMERGENCY DEPT VISIT LOW MDM: CPT

## 2021-06-10 NOTE — NUR
PATIENT WHEELED BACK FROM TRIAGE WITH CHIEF C/O LEFT ANKLE INJURY.  PER PATIENT 
"I THINK I BROKE MY ANKLE."  PATIENT JUMPED OFF PORCH LAST NIGHT AND LANDED ON 
STICK ON HIS LEFT ANKLE.  LEFT ANKLE WRAPPED IN ACE WRAP, WHEN REMOVED ANKLE IS 
SWOLLEN AND TENDER TO MOVE OR TOUCH.  



ERPA AT BEDSIDE FOR EVALUATION.

## 2021-06-10 NOTE — NUR
Patient given discharge instructions and prescription and they have confirmed 
that they understand the instructions.  Patient ambulatory with steady gait and 
use of crutches. NAD, all questions answered appropriately, denies additional 
needs at this time. No personal belongings left in room after discharge.

## 2021-06-17 ENCOUNTER — APPOINTMENT (OUTPATIENT)
Dept: PHYSICAL THERAPY | Facility: REHABILITATION | Age: 41
End: 2021-06-17
Attending: PHYSICIAN ASSISTANT
Payer: MEDICAID

## 2021-06-25 ENCOUNTER — TELEPHONE (OUTPATIENT)
Dept: PHYSICAL THERAPY | Facility: REHABILITATION | Age: 41
End: 2021-06-25

## 2021-06-25 NOTE — OP THERAPY DISCHARGE SUMMARY
Outpatient Physical Therapy  DISCHARGE SUMMARY NOTE      [facility] [address]  [address]  [facility] [address]  Phone:  [phone]  Fax:  [phone]    Date of Visit: 06/25/2021    Patient: North Kohli  YOB: 1980  MRN: [id number]     Referring Provider: Victorina Iglesias P.A.-C.  [address] [place] [address]   Referring Diagnosis Chronic left-sided low back pain with left-sided sciatica [M54.42, G89.29]             Your patient is being discharged from Physical Therapy with the following comments:   · Pt unable to continue PT at this time due to schedule conflicts, and has since broken his ankle.    Comments:  Pt attended 3 PT visits 5/3 to 5/27/21 for his back pain.       Darleen Van, PT    Date: 6/25/2021

## 2022-02-10 ENCOUNTER — OFFICE VISIT (OUTPATIENT)
Dept: MEDICAL GROUP | Facility: MEDICAL CENTER | Age: 42
End: 2022-02-10
Attending: PHYSICIAN ASSISTANT
Payer: MEDICAID

## 2022-02-10 VITALS
WEIGHT: 175.8 LBS | HEIGHT: 77 IN | HEART RATE: 76 BPM | TEMPERATURE: 97.4 F | OXYGEN SATURATION: 98 % | SYSTOLIC BLOOD PRESSURE: 118 MMHG | BODY MASS INDEX: 20.76 KG/M2 | DIASTOLIC BLOOD PRESSURE: 70 MMHG | RESPIRATION RATE: 18 BRPM

## 2022-02-10 DIAGNOSIS — M25.562 CHRONIC PAIN OF LEFT KNEE: ICD-10-CM

## 2022-02-10 DIAGNOSIS — G89.29 CHRONIC PAIN OF LEFT KNEE: ICD-10-CM

## 2022-02-10 PROCEDURE — 99212 OFFICE O/P EST SF 10 MIN: CPT | Performed by: PHYSICIAN ASSISTANT

## 2022-02-10 PROCEDURE — 99213 OFFICE O/P EST LOW 20 MIN: CPT | Performed by: PHYSICIAN ASSISTANT

## 2022-02-10 RX ORDER — MELOXICAM 15 MG/1
15 TABLET ORAL DAILY
Qty: 30 TABLET | Refills: 2 | Status: SHIPPED | OUTPATIENT
Start: 2022-02-10 | End: 2022-08-16

## 2022-02-10 ASSESSMENT — FIBROSIS 4 INDEX: FIB4 SCORE: 0.75

## 2022-02-10 NOTE — ASSESSMENT & PLAN NOTE
"Onset/ALISA: 4 months and worsening.    Location: Left medial knee.   Duration: Constant.   Character: \"Sharp pain. Even painful to the touch.\"  Alleviating factors:  Aggravating factors: Running/activity. Laying on his side with his knee slightly bent.   Radiation: None.   Treatments tried: Advil or Tylenol but has had minimal benefit. This has never been worked up previously.   No red flag signs.  "

## 2022-02-10 NOTE — PROGRESS NOTES
"Chief Complaint   Patient presents with   • Knee Pain     Subjective:     HPI:   North Kohli is a 41 y.o. male here to discuss the evaluation and management of:    Chronic pain of left knee  Onset/ALISA: 4 months and worsening.    Location: Left medial knee.   Duration: Constant.   Character: \"Sharp pain. Even painful to the touch.\"  Alleviating factors:  Aggravating factors: Running/activity. Laying on his side with his knee slightly bent.   Radiation: None.   Treatments tried: Advil or Tylenol but has had minimal benefit. This has never been worked up previously.   No red flag signs.    ROS  See HPI.     No Known Allergies    Current medicines (including changes today)  Current Outpatient Medications   Medication Sig Dispense Refill   • meloxicam (MOBIC) 15 MG tablet Take 1 Tablet by mouth every day. 30 Tablet 2   • Omeprazole 20 MG Tablet Delayed Release Dispersible Take 20 mg by mouth every day. 30 tablet 5   • naproxen (NAPROSYN) 250 MG Tab Take 250 mg by mouth 2 times a day, with meals.       No current facility-administered medications for this visit.       Social History     Tobacco Use   • Smoking status: Never Smoker   • Smokeless tobacco: Never Used   Vaping Use   • Vaping Use: Never used   Substance Use Topics   • Alcohol use: Not Currently   • Drug use: Not Currently     Comment: meth and THC       Patient Active Problem List    Diagnosis Date Noted   • Chronic pain of left knee 02/10/2022   • Gastroesophageal reflux disease without esophagitis 04/21/2021   • Numerous skin moles 04/21/2021   • Chronic left-sided low back pain with left-sided sciatica 03/31/2021   • Pain of left great toe 03/31/2021       Family History   Problem Relation Age of Onset   • Lung Disease Neg Hx    • Psychiatric Illness Neg Hx    • Heart Disease Neg Hx    • Diabetes Neg Hx    • Hypertension Neg Hx    • Hyperlipidemia Neg Hx         Objective:     /70 (BP Location: Left arm, Patient Position: Sitting, BP Cuff " "Size: Adult)   Pulse 76   Temp 36.3 °C (97.4 °F) (Skin)   Resp 18   Ht 1.956 m (6' 5\")   Wt 79.7 kg (175 lb 12.8 oz)   SpO2 98%  Body mass index is 20.85 kg/m².    Physical Exam:  Physical Exam  Vitals reviewed.   Constitutional:       Appearance: Normal appearance.   HENT:      Head: Normocephalic.      Right Ear: External ear normal.      Left Ear: External ear normal.   Cardiovascular:      Rate and Rhythm: Normal rate and regular rhythm.      Heart sounds: Normal heart sounds.   Pulmonary:      Effort: Pulmonary effort is normal.      Breath sounds: Normal breath sounds.   Musculoskeletal:      Left knee: Tenderness present over the medial joint line and MCL. Abnormal meniscus.      Instability Tests: Medial Aida test positive and lateral Aida test positive.   Neurological:      Mental Status: He is alert and oriented to person, place, and time.       Assessment and Plan:     The following treatment plan was discussed:    1. Chronic pain of left knee  - his is a chronic worsening condition.  - Plan: Concern for meniscal and/or MCL injury. Obtain 2 view xray of the knee. This will need to be followed up with an MRI to evaluate for internal derangement. Start on a combination of Tylenol and Meloxicam for the next 7 days and then as needed thereafter. Patient was fitted with a hinged knee brace in clinic today.  - meloxicam (MOBIC) 15 MG tablet; Take 1 Tablet by mouth every day.  Dispense: 30 Tablet; Refill: 2  - DX-KNEE 2- LEFT; Future     Any change or worsening of signs or symptoms, patient encouraged to follow-up or report to emergency room for further evaluation. Patient verbalizes understanding and agrees.    Follow-Up: Return if symptoms worsen or fail to improve.      PLEASE NOTE: This dictation was created using voice recognition software. I have made every reasonable attempt to correct obvious errors, but I expect that there are errors of grammar and possibly content that I did not discover " before finalizing the note.

## 2022-05-03 ENCOUNTER — TELEPHONE (OUTPATIENT)
Dept: MEDICAL GROUP | Facility: MEDICAL CENTER | Age: 42
End: 2022-05-03
Payer: MEDICAID

## 2022-05-03 NOTE — TELEPHONE ENCOUNTER
VOICEMAIL  1. Caller Name: North Kohli            2. Message: attempted to call pt phone number is out of services he left voicemail requesting imaging results not sure I only heard his name he left his new phone number but it was cutting off.    3. Patient approves office to leave a detailed voicemail/MyChart message: yes

## 2022-08-16 ENCOUNTER — OFFICE VISIT (OUTPATIENT)
Dept: MEDICAL GROUP | Facility: MEDICAL CENTER | Age: 42
End: 2022-08-16
Attending: INTERNAL MEDICINE
Payer: MEDICAID

## 2022-08-16 VITALS
SYSTOLIC BLOOD PRESSURE: 128 MMHG | HEIGHT: 77 IN | RESPIRATION RATE: 16 BRPM | BODY MASS INDEX: 20.55 KG/M2 | TEMPERATURE: 97.7 F | WEIGHT: 174 LBS | DIASTOLIC BLOOD PRESSURE: 82 MMHG | HEART RATE: 88 BPM | OXYGEN SATURATION: 98 %

## 2022-08-16 DIAGNOSIS — Z13.220 SCREENING, LIPID: ICD-10-CM

## 2022-08-16 DIAGNOSIS — F15.10 METHAMPHETAMINE USE (HCC): ICD-10-CM

## 2022-08-16 DIAGNOSIS — K21.9 GASTROESOPHAGEAL REFLUX DISEASE WITHOUT ESOPHAGITIS: ICD-10-CM

## 2022-08-16 DIAGNOSIS — Z13.1 SCREENING FOR DIABETES MELLITUS: ICD-10-CM

## 2022-08-16 DIAGNOSIS — Z72.0 TOBACCO USE: ICD-10-CM

## 2022-08-16 DIAGNOSIS — G56.22 ULNAR NEUROPATHY OF LEFT UPPER EXTREMITY: ICD-10-CM

## 2022-08-16 PROBLEM — M54.42 CHRONIC LEFT-SIDED LOW BACK PAIN WITH LEFT-SIDED SCIATICA: Status: RESOLVED | Noted: 2021-03-31 | Resolved: 2022-08-16

## 2022-08-16 PROBLEM — M25.562 CHRONIC PAIN OF LEFT KNEE: Status: RESOLVED | Noted: 2022-02-10 | Resolved: 2022-08-16

## 2022-08-16 PROBLEM — G89.29 CHRONIC LEFT-SIDED LOW BACK PAIN WITH LEFT-SIDED SCIATICA: Status: RESOLVED | Noted: 2021-03-31 | Resolved: 2022-08-16

## 2022-08-16 PROBLEM — G89.29 CHRONIC PAIN OF LEFT KNEE: Status: RESOLVED | Noted: 2022-02-10 | Resolved: 2022-08-16

## 2022-08-16 PROBLEM — D22.9 NUMEROUS SKIN MOLES: Status: RESOLVED | Noted: 2021-04-21 | Resolved: 2022-08-16

## 2022-08-16 PROBLEM — M79.675 PAIN OF LEFT GREAT TOE: Status: RESOLVED | Noted: 2021-03-31 | Resolved: 2022-08-16

## 2022-08-16 PROCEDURE — 99214 OFFICE O/P EST MOD 30 MIN: CPT | Performed by: INTERNAL MEDICINE

## 2022-08-16 PROCEDURE — 99213 OFFICE O/P EST LOW 20 MIN: CPT | Performed by: INTERNAL MEDICINE

## 2022-08-16 RX ORDER — NICOTINE 21 MG/24HR
1 PATCH, TRANSDERMAL 24 HOURS TRANSDERMAL EVERY 24 HOURS
Qty: 30 PATCH | Refills: 0 | Status: SHIPPED | OUTPATIENT
Start: 2022-08-16

## 2022-08-16 RX ORDER — GABAPENTIN 300 MG/1
300 CAPSULE ORAL 2 TIMES DAILY PRN
Qty: 30 CAPSULE | Refills: 0 | Status: SHIPPED | OUTPATIENT
Start: 2022-08-16

## 2022-08-16 ASSESSMENT — FIBROSIS 4 INDEX: FIB4 SCORE: 0.76

## 2022-08-16 ASSESSMENT — PATIENT HEALTH QUESTIONNAIRE - PHQ9: CLINICAL INTERPRETATION OF PHQ2 SCORE: 0

## 2022-08-16 NOTE — ASSESSMENT & PLAN NOTE
Currently smoking half pack to quarter pack a day.  He is interested in quitting.  He would like to try nicotine patches as he has never done anything to help him quit in the past.

## 2022-08-16 NOTE — PROGRESS NOTES
North Kohli is a 41 y.o. male here for numbness and tingling in his left arm, establish care  HPI:  previous PCP was Victorina Kennedy  Gastroesophageal reflux disease without esophagitis  He reports a long history of GERD.  In the past he has used ranitidine until it was recalled and then he was started on Prilosec.  Reports this was very helpful for him but then his prescription ran out and he has not been able to get it refilled.  He reports daily symptoms and he would like to restart medication.  Denies melena, hematochezia, nausea, vomiting.    Methamphetamine use (HCC)  Uses 1-2 times a week by smoking.    Tobacco use  Currently smoking half pack to quarter pack a day.  He is interested in quitting.  He would like to try nicotine patches as he has never done anything to help him quit in the past.    Ulnar neuropathy of left upper extremity  He reports numbness and tingling for the past month along the distribution of the ulnar nerve.  He states that his fifth and fourth finger are the most severely affected and he is having trouble holding things or making a fist.  The symptoms are present constantly and they get worse at night, waking him up from sleep.  States this started after he began a new workout program where he does weights and his circuit pattern but he cannot identify any specific aggravating exercise and he denies any recent injuries.  He is having some neck pain but denies any history of neck injuries or arthritis.  Has tried naproxen with little benefit.  Current medicines (including changes today)  Current Outpatient Medications   Medication Sig Dispense Refill    Omeprazole 20 MG Tablet Delayed Release Dispersible Take 20 mg by mouth every day. 30 Tablet 5    nicotine (NICODERM) 14 MG/24HR PATCH 24 HR Place 1 Patch on the skin every 24 hours. 30 Patch 0    nicotine (NICODERM) 7 MG/24HR PATCH 24 HR Place 1 Patch on the skin every 24 hours. 30 Patch 0    gabapentin (NEURONTIN) 300 MG Cap  Take 1 Capsule by mouth 2 times a day as needed (numbness/tingling in arm). 30 Capsule 0     No current facility-administered medications for this visit.     He  has a past medical history of Anxiety, Depression, and GERD (gastroesophageal reflux disease).  He  has a past surgical history that includes ent surgery (2000).  Social History     Tobacco Use    Smoking status: Every Day     Packs/day: 0.25     Years: 10.00     Pack years: 2.50     Types: Cigarettes    Smokeless tobacco: Never   Vaping Use    Vaping Use: Never used   Substance Use Topics    Alcohol use: Yes     Alcohol/week: 0.6 oz     Types: 1 Standard drinks or equivalent per week    Drug use: Yes     Types: Methamphetamines     Comment: 1-2 times a week meth     Social History     Social History Narrative    Not on file     Family History   Problem Relation Age of Onset    Hyperlipidemia Mother     Hypertension Mother     Stroke Mother     Cancer Mother         throat    Cancer Paternal Grandmother         stomach    Cancer Paternal Grandfather         stomach    Lung Disease Neg Hx     Psychiatric Illness Neg Hx     Heart Disease Neg Hx     Diabetes Neg Hx           Objective:     Vitals:    08/16/22 0942   BP: 128/82   Pulse: 88   Resp: 16   Temp: 36.5 °C (97.7 °F)   SpO2: 98%     Body mass index is 20.63 kg/m².  Physical Exam:    Constitutional: Alert, no distress.  Skin: Warm, dry, good turgor, no rashes in visible areas.  Eye: Equal, round and reactive, conjunctiva clear, lids normal.  ENMT: Lips without lesions, fair dentition, oropharynx clear, TM's clear bilaterally.  Neck: Trachea midline, no masses, no thyromegaly. No cervical or supraclavicular lymphadenopathy.  Respiratory: Unlabored respiratory effort, lungs clear to auscultation, no wheezes, no ronchi.  Cardiovascular: Regular rate and rhythm, no murmurs appreciated, no lower extremity edema.  Abdomen: Soft, non-tender, no masses, no hepatosplenomegaly.  Psych: Alert and oriented x3,  normal affect and mood.  Neuro: 4/5 L  strength, 4+/5 left biceps flexion, triceps flexion, and deltoid abduction, 5/5 RUE strength diffusely.  Diminished sensation over ulnar nerve distribution on LUE.  Negative tinel and phalen b/l.  Negative cubital tunnel tinel on Left      Assessment and Plan:   The following treatment plan was discussed    1. Gastroesophageal reflux disease without esophagitis  Well controlled while on omeprazole.  Will restart  - Comp Metabolic Panel; Future  - Omeprazole 20 MG Tablet Delayed Release Dispersible; Take 20 mg by mouth every day.  Dispense: 30 Tablet; Refill: 5    2. Screening, lipid  - Lipid Profile; Future    3. Screening for diabetes mellitus  - HEMOGLOBIN A1C; Future    4. Tobacco use  - nicotine (NICODERM) 14 MG/24HR PATCH 24 HR; Place 1 Patch on the skin every 24 hours.  Dispense: 30 Patch; Refill: 0  - nicotine (NICODERM) 7 MG/24HR PATCH 24 HR; Place 1 Patch on the skin every 24 hours.  Dispense: 30 Patch; Refill: 0    5. Ulnar neuropathy of left upper extremity  His symptoms are consistent with ulnar neuropathy however unclear what is triggering.  He does not have classic cubital tunnel symptoms nor does he have severe neck pain.  Because of this we discussed obtaining an EMG to evaluate nerve conduction.  We will start on gabapentin and he will notify me via MyChart regarding efficacy and whether he would like a refill.  We will also obtain basic neck imaging and B12 level  - Referral to Neurodiagnostics (EEG,EP,EMG/NCS/DBS)  - DX-CERVICAL SPINE-2 OR 3 VIEWS; Future  - VITAMIN B12; Future  - gabapentin (NEURONTIN) 300 MG Cap; Take 1 Capsule by mouth 2 times a day as needed (numbness/tingling in arm).  Dispense: 30 Capsule; Refill: 0    6. Methamphetamine use (HCC)  Stable, continue to monitor and encourage cessation.        Followup: Return if symptoms worsen or fail to improve.

## 2022-08-16 NOTE — ASSESSMENT & PLAN NOTE
He reports a long history of GERD.  In the past he has used ranitidine until it was recalled and then he was started on Prilosec.  Reports this was very helpful for him but then his prescription ran out and he has not been able to get it refilled.  He reports daily symptoms and he would like to restart medication.  Denies melena, hematochezia, nausea, vomiting.

## 2022-08-16 NOTE — ASSESSMENT & PLAN NOTE
He reports numbness and tingling for the past month along the distribution of the ulnar nerve.  He states that his fifth and fourth finger are the most severely affected and he is having trouble holding things or making a fist.  The symptoms are present constantly and they get worse at night, waking him up from sleep.  States this started after he began a new workout program where he does weights and his circuit pattern but he cannot identify any specific aggravating exercise and he denies any recent injuries.  He is having some neck pain but denies any history of neck injuries or arthritis.  Has tried naproxen with little benefit.

## 2022-09-11 ENCOUNTER — HOSPITAL ENCOUNTER (EMERGENCY)
Facility: MEDICAL CENTER | Age: 42
End: 2022-09-11
Attending: EMERGENCY MEDICINE
Payer: MEDICAID

## 2022-09-11 ENCOUNTER — APPOINTMENT (OUTPATIENT)
Dept: RADIOLOGY | Facility: MEDICAL CENTER | Age: 42
End: 2022-09-11
Attending: EMERGENCY MEDICINE
Payer: MEDICAID

## 2022-09-11 VITALS
OXYGEN SATURATION: 96 % | SYSTOLIC BLOOD PRESSURE: 114 MMHG | BODY MASS INDEX: 23.62 KG/M2 | HEIGHT: 72 IN | HEART RATE: 65 BPM | TEMPERATURE: 97.9 F | WEIGHT: 174.38 LBS | RESPIRATION RATE: 16 BRPM | DIASTOLIC BLOOD PRESSURE: 71 MMHG

## 2022-09-11 DIAGNOSIS — G56.22 ULNAR NEUROPATHY OF LEFT UPPER EXTREMITY: ICD-10-CM

## 2022-09-11 LAB — VIT B12 SERPL-MCNC: 447 PG/ML (ref 211–911)

## 2022-09-11 PROCEDURE — 99284 EMERGENCY DEPT VISIT MOD MDM: CPT

## 2022-09-11 PROCEDURE — 82607 VITAMIN B-12: CPT

## 2022-09-11 PROCEDURE — 72040 X-RAY EXAM NECK SPINE 2-3 VW: CPT

## 2022-09-11 PROCEDURE — 36415 COLL VENOUS BLD VENIPUNCTURE: CPT

## 2022-09-11 ASSESSMENT — FIBROSIS 4 INDEX: FIB4 SCORE: 0.78

## 2022-09-11 NOTE — ED PROVIDER NOTES
ED Provider Note    CHIEF COMPLAINT  Chief Complaint   Patient presents with    Tingling     Pt c/o numbness & tingling down L arm, starting form shoulder & running down lateral arm to pinky & ring finger. States it is worse when laying flat. Ongoing x 1 month, worse in the morning. CSM intact.        HPI  North Kohli is a 42 y.o. male who presents to the emergency department with complaint of tingling in his left hand.  He states he has had tingling ongoing for proximally 2 months and is left fourth and fifth fingers that radiates up to his elbow that up to his shoulder.  He was seen her primary care doctor who ordered outpatient EMG evaluation, cervical spine  , Vitamin B12 level.  He states that he was uncertain if he needed to go to the hospital or not for that so he came in the emergency department as he was in town.  He states that his symptoms have been increasing in severity, usually more chronic in the morning and decreases throughout the day.  Denies severe neck pain, lightheadedness, dizziness, neck fullness, shortness of breath, cough, fever or recent trauma.    REVIEW OF SYSTEMS  Positives as above. Pertinent negatives include recent trauma, fever, neck pain, lightheadedness, dizziness, headache or loss of strength      PAST MEDICAL HISTORY  Past Medical History:   Diagnosis Date    Anxiety     Depression     GERD (gastroesophageal reflux disease)        FAMILY HISTORY  Noncontributory    SOCIAL HISTORY  Social History     Socioeconomic History    Marital status: Single   Tobacco Use    Smoking status: Every Day     Packs/day: 0.25     Years: 10.00     Pack years: 2.50     Types: Cigarettes    Smokeless tobacco: Never   Vaping Use    Vaping Use: Never used   Substance and Sexual Activity    Alcohol use: Yes     Alcohol/week: 0.6 oz     Types: 1 Standard drinks or equivalent per week    Drug use: Yes     Types: Methamphetamines     Comment: 1-2 times a week meth    Sexual activity: Not Currently      Partners: Female       SURGICAL HISTORY  Past Surgical History:   Procedure Laterality Date    ENT SURGERY  2000    adenoids and tubes removed.        CURRENT MEDICATIONS  Home Medications       Reviewed by Christina Portillo R.N. (Registered Nurse) on 09/11/22 at 1241  Med List Status: Not Addressed     Medication Last Dose Status   gabapentin (NEURONTIN) 300 MG Cap  Active   nicotine (NICODERM) 14 MG/24HR PATCH 24 HR  Active   nicotine (NICODERM) 7 MG/24HR PATCH 24 HR  Active   Omeprazole 20 MG Tablet Delayed Release Dispersible  Active                    ALLERGIES  No Known Allergies    PHYSICAL EXAM  VITAL SIGNS: /72   Pulse 66   Temp 36.1 °C (97 °F) (Temporal)   Resp 16   Ht 1.829 m (6')   Wt 79.1 kg (174 lb 6.1 oz)   SpO2 95%   BMI 23.65 kg/m²      Constitutional: Well developed, Well nourished, No acute distress, Non-toxic appearance.   Eyes: PERRLA, EOMI, Conjunctiva normal, No discharge.   Cardiovascular: Normal heart rate, Normal rhythm, No murmurs, No rubs, No gallops, and intact distal pulses.   Neck: No edema, no tenderness  Thorax & Lungs:  No respiratory distress, no rales, no rhonchi, No wheezing, No chest wall tenderness.   Skin: Warm, Dry, No erythema, No rash.   Extremities: Left upper extremity, full range of motion, no tenderness from the shoulder down to the fingertips, distal pulses are brisk left upper extremity, no deformity   Neurologic: Paresthesia to the ulnar distribution of the left upper extremity at the fingers and slight palm, no decrease sensation in the forearm, the shoulder.  Ulnar, radial and medial nerve 5/5 to strength.  Radial, medial and axillary nerve intact sensation as well.  Patient has full range of motion at the shoulder joint, elbow joint and the wrist and hand, there is no evidence of weakness  psychiatric: Affect normal for clinical presentation.      LABORATORY/ECG  Results for orders placed or performed during the hospital encounter of 09/11/22    VITAMIN B12   Result Value Ref Range    Vitamin B12 -True Cobalamin 447 211 - 911 pg/mL         RADIOLOGY/PROCEDURES  DX-CERVICAL SPINE-2 OR 3 VIEWS   Final Result      Normal cervical spine.            COURSE & MEDICAL DECISION MAKING  Pertinent Labs & Imaging studies reviewed. (See chart for details)  Is a pleasant 42-year-old male who presents with ulnar neuropathy of the left upper extremity.  I did review his previous visitation his primary care doctor and asking for spine x-ray as well as edilia B12 and EMG studies.  Fortunately manual complete EMG studies.  Patient does not have an acute emergency medical condition with a chronic neuropathy.  He will need further neurological testing and evaluation.  The patient's x-ray is negative, B12 was normal the patient was discharged in instructed follow-up with the EMG lab with [facility] neurodiagnostic laboratory.  In addition to follow his primary care physician.      FINAL IMPRESSION     1. Ulnar neuropathy of left upper extremity Active       DISPOSITION:  Patient will be discharged home in stable condition.    FOLLOW UP:  Barbara Aguilar M.D.  [address] [place] [id number]  [phone]    Schedule an appointment as soon as possible for a visit           Electronically signed by: Munir Haley D.O., 9/11/2022 2:08 PM

## 2022-09-11 NOTE — DISCHARGE INSTRUCTIONS
Please contact [facility] by calling [phone] and asked to be redirected to [facility] neurodiagnostic laboratory for scheduling.

## 2022-09-11 NOTE — ED TRIAGE NOTES
North Kohli  42 y.o.  male  Chief Complaint   Patient presents with   • Tingling     Pt c/o numbness & tingling down L arm, starting form shoulder & running down lateral arm to pinky & ring finger. States it is worse when laying flat. Ongoing x 1 month, worse in the morning. CSM intact.        Saw PCP a few weeks ago for same complaint, has requisition for Xray & labs, did not complete outpatient due to wait times.   Patient educated on triage process, to alert staff if any changes in condition.

## 2022-09-11 NOTE — ED NOTES
Patient from [facility] to [facility] 82 ambulatory with steady gait accompanied by ED Tech. Chart up for ERP.

## 2022-09-27 ENCOUNTER — NON-PROVIDER VISIT (OUTPATIENT)
Dept: NEUROLOGY | Facility: MEDICAL CENTER | Age: 42
End: 2022-09-27
Attending: SPECIALIST
Payer: MEDICAID

## 2022-09-27 DIAGNOSIS — G56.22 LESION OF LEFT ULNAR NERVE: ICD-10-CM

## 2022-09-27 PROCEDURE — 95908 NRV CNDJ TST 3-4 STUDIES: CPT | Mod: 26 | Performed by: SPECIALIST

## 2022-09-27 PROCEDURE — 95886 MUSC TEST DONE W/N TEST COMP: CPT | Mod: 26 | Performed by: SPECIALIST

## 2022-09-27 PROCEDURE — 95908 NRV CNDJ TST 3-4 STUDIES: CPT | Performed by: SPECIALIST

## 2022-09-27 PROCEDURE — 95886 MUSC TEST DONE W/N TEST COMP: CPT | Performed by: SPECIALIST

## 2022-09-27 NOTE — PROCEDURES
NERVE CONDUCTION STUDIES AND ELECTROMYOGRAPHY REPORT        09/27/22      Referring provider: Barbara Aguilar M.D.      SUMMARY OF PATIENT'S CLINICAL HISTORY,PHYSICAL EXAM, AND RATIONALE FOR TESTING:    Mr. [place] Kohli 42 y.o. presenting with numbness and pain in the left hand involving the fourth and fifth digits.    Past Medical History is significant for :   Past Medical History:   Diagnosis Date    Anxiety     Depression     GERD (gastroesophageal reflux disease)        The electrodiagnostic studies were performed to evaluate for possible peripheral neuropathy versus radiculopathy.      ELECTRODIAGNOSTIC EXAMINATION:  Nerve conduction studies (NCS) and electromyography (EMG) are utilized to evaluate direct or indirect damage to the peripheral nervous system. NCS are performed to measure the nerve(s) response(s) to electrostimulation across a given nerve segment. EMG evaluates the passive and active electrical activity of the muscle(s) in question.  Muscles are innervated by specific peripheral nerves and roots. Often times, several nerves the muscle to be examined in order to determine the presence or absence of the disease process. Furthermore, nerves and muscles may need to be tested in a [id number] comparison, as well as in additional extremities, as this may be crucial in characterizing the extent of the disease process, which may be diffuse or isolated and of varying degree of severity. The extent of the neurodiagnostic exam is justified as it may help arrive to a proper diagnosis, which ultimately may contribute to better management of the patient. Therefore, the nerves to muscles examined during the study were medically necessary.    Unless otherwise noted, temperature of the extremity(s) study was monitored before and during the examination and remained between 32 and 36 degrees C for the upper extremities, and between 30 and 36 degrees C for the lower extremities.      NERVE CONDUCTION  STUDIES:  Sensory nerves:   -Left median sensory nerve was examined.The response was within normal limits.  -Left ulnar sensory nerve was examined. The response was within normal limits.    Motor nerves:   -Left median motor nerve was examined. Recording electrodes placed at the Abductor Pollicis Brevis muscles. The response was within normal limits.  -Left ulnar motor nerve was examined. Recording electrodes placed at the Abductor Digiti Minimi muscles. The response was abnormal.  Onset latency was within normal limits, amplitude was within normal limits and distal conduction was within normal limits.  Conduction velocity slowed from 51 to 43 m/s across the elbow.      ELECTROMYOGRAPHY:  The study was performed the concentric needle electrode. Fibrillation and fasciculation activity is graded by convention from none (0) to continuous (4+).  Needle electrode examination was performed in the following muscles: Left deltoid, biceps, triceps, first dorsal interosseous, abductor pollicis brevis.  No acute denervation changes were noted, there was no increased insertional activity fibrillation potentials or positive sharp waves.  Mild chronic neuropathic changes consisting of decreased recruitment of increased amplitude potentials were noted in the first dorsal interosseous muscle.  Other muscles studied were normal electrophysiologically.          Nerve Conduction Studies     Stim Site NR Onset (ms) Norm Onset (ms) O-P Amp (µV) Norm O-P Amp Site1 Site2 Delta-P (ms) Dist (cm) Boby (m/s) Norm Boby (m/s)   Left Median Anti Sensory (2nd Digit)   Wrist    2.3 <3.4 21.5 >20 Wrist 2nd Digit 3.4 14.0 *41 >50   Left Ulnar Anti Sensory (5th Digit)   Wrist    2.6 <3.1 12.7 >12 Wrist 5th Digit 3.4 14.0 *41 >50        Stim Site NR Onset (ms) Norm Onset (ms) O-P Amp (mV) Norm O-P Amp Site1 Site2 Delta-0 (ms) Dist (cm) Boby (m/s) Norm Boby (m/s)   Left Median Motor (Abd Poll Brev)   Wrist    3.9 <3.9 9.2 >6 Elbow Wrist 5.0 27.0 54 >50    Elbow    8.9  9.0          Left Ulnar Motor (Abd Dig Min)   Wrist    2.9 <3.1 7.2 >7 B Elbow Wrist 4.3 22.0 51 >50   B Elbow    7.2  5.7  A Elbow B Elbow 2.3 10.0 43    A Elbow    9.5  5.2                        Electromyography     Side Muscle Nerve Root Ins Act Fibs Psw Amp Dur Poly Recrt Int Pat Comment   Left Deltoid Axillary C5-6 Nml Nml Nml Nml Nml 0 Nml Nml    Left Biceps Musculocut C5-6 Nml Nml Nml Nml Nml 0 Nml Nml    Left Triceps Radial C6-7-8 Nml Nml Nml Nml Nml 0 Nml Nml    Left 1stDorInt Ulnar C8-T1 Nml Nml Nml *Incr Nml 0 *Reduced *75%    Left Abd Poll Brev Median C8-T1 Nml Nml Nml Nml Nml 0 Nml Nml            DIAGNOSTIC INTERPRETATION:   Extensive electrodiagnostic studies were performed to the left upper extremity.  The results are as follows:    1.  Left ulnar nerve slowing across the elbow which is of moderate severity electrophysiologically and associated with mild chronic neuropathic changes in ulnar nerve supplied hand muscles.    2.  Normal left median nerve motor and sensory conduction studies.    3.  No evidence of radiculopathy in selected muscles studied left upper extremity.          ANUJ Quintana M.D. (No note.)
